# Patient Record
Sex: FEMALE | Race: WHITE | HISPANIC OR LATINO | Employment: PART TIME | URBAN - METROPOLITAN AREA
[De-identification: names, ages, dates, MRNs, and addresses within clinical notes are randomized per-mention and may not be internally consistent; named-entity substitution may affect disease eponyms.]

---

## 2017-01-25 ENCOUNTER — HOSPITAL ENCOUNTER (OUTPATIENT)
Dept: RADIOLOGY | Facility: MEDICAL CENTER | Age: 27
Discharge: HOME | End: 2017-01-25
Attending: PHYSICIAN ASSISTANT | Admitting: FAMILY MEDICINE
Payer: COMMERCIAL

## 2017-01-25 ENCOUNTER — OFFICE VISIT (OUTPATIENT)
Dept: URGENT CARE | Facility: MEDICAL CENTER | Age: 27
End: 2017-01-25
Payer: COMMERCIAL

## 2017-01-25 DIAGNOSIS — R05.9 COUGH: ICD-10-CM

## 2017-01-25 PROCEDURE — G0382 LEV 3 HOSP TYPE B ED VISIT: HCPCS

## 2017-01-25 PROCEDURE — S9083 URGENT CARE CENTER GLOBAL: HCPCS

## 2017-01-25 PROCEDURE — 71020 HB CHEST X-RAY 2VW FRONTAL&LATL: CPT

## 2018-01-18 NOTE — MISCELLANEOUS
Message  Return to work or school:   Dalbert Boas is under my professional care  She was seen in my office on Friday, October 21, 2016   She is able to return to work on  Saturday, October 22, 2016      No restrictions  KENNY Mansfield        Signatures   Electronically signed by : KENNY Ferrari; Oct 21 2016  3:16PM EST                       (Author)    Electronically signed by : Jenny Ferrari; Oct 21 2016  3:17PM EST                       (Author)

## 2018-01-18 NOTE — MISCELLANEOUS
Message  Return to work or school:   Madalyn Del Rosario is under my professional care  She was seen in my office on 1/25/2017   She is able to return to work on  1/27/2017       Gogo Henson PA-C        Signatures   Electronically signed by : JAZMINE Luevano; Jan 25 2017  5:13PM EST                       (Author)    Electronically signed by : Ronald Spann [facility]; Jan 25 2017  5:13PM EST                       (Author)    Electronically signed by : Ronald Spann [facility]; Jan 25 2017  5:14PM EST                       (Author)    Electronically signed by : Ronald Spann [facility]; Jan 25 2017  5:15PM EST                       (Author)

## 2022-09-22 ENCOUNTER — TELEPHONE (OUTPATIENT)
Dept: FAMILY MEDICINE CLINIC | Facility: CLINIC | Age: 32
End: 2022-09-22